# Patient Record
Sex: FEMALE
[De-identification: names, ages, dates, MRNs, and addresses within clinical notes are randomized per-mention and may not be internally consistent; named-entity substitution may affect disease eponyms.]

---

## 2023-07-06 ENCOUNTER — NON-APPOINTMENT (OUTPATIENT)
Age: 38
End: 2023-07-06

## 2023-07-06 PROBLEM — Z00.00 ENCOUNTER FOR PREVENTIVE HEALTH EXAMINATION: Status: ACTIVE | Noted: 2023-07-06

## 2023-07-11 ENCOUNTER — APPOINTMENT (OUTPATIENT)
Dept: BREAST CENTER | Facility: CLINIC | Age: 38
End: 2023-07-11
Payer: COMMERCIAL

## 2023-07-11 ENCOUNTER — NON-APPOINTMENT (OUTPATIENT)
Age: 38
End: 2023-07-11

## 2023-07-11 VITALS
HEIGHT: 70 IN | WEIGHT: 145 LBS | OXYGEN SATURATION: 97 % | BODY MASS INDEX: 20.76 KG/M2 | HEART RATE: 75 BPM | SYSTOLIC BLOOD PRESSURE: 102 MMHG | DIASTOLIC BLOOD PRESSURE: 70 MMHG

## 2023-07-11 DIAGNOSIS — N61.0 MASTITIS WITHOUT ABSCESS: ICD-10-CM

## 2023-07-11 PROCEDURE — 99204 OFFICE O/P NEW MOD 45 MIN: CPT

## 2023-07-11 NOTE — PHYSICAL EXAM
[Normocephalic] : normocephalic [EOMI] : extra ocular movement intact [Examined in the supine and seated position] : examined in the supine and seated position [No dominant masses] : no dominant masses in right breast  [No dominant masses] : no dominant masses left breast [No Nipple Retraction] : no left nipple retraction [No Nipple Discharge] : no left nipple discharge [No Axillary Lymphadenopathy] : no left axillary lymphadenopathy [No Rashes] : no rashes [de-identified] : No erythema, no signs of active infection

## 2023-07-11 NOTE — PAST MEDICAL HISTORY
[Menarche Age ____] : age at menarche was [unfilled] [Total Preg ___] : G[unfilled] [Live Births ___] : P[unfilled]  [Age At Live Birth ___] : Age at live birth: [unfilled] [Definite ___ (Date)] : the last menstrual period was [unfilled] [AB Spont ___] : miscarriages: [unfilled]  [History of Hormone Replacement Treatment] : has no history of hormone replacement treatment [FreeTextEntry7] : No [FreeTextEntry8] : Yes

## 2023-07-11 NOTE — HISTORY OF PRESENT ILLNESS
[FreeTextEntry1] : AMBAR is a 37 year old female, referred by Dr. Silke Liu, who presents for initial evaluation regarding right/left mastitis.  The patient initially noted some breast discomfort in her left breast on June 5.  She was away at a wedding and had not been consistent with her breast-feeding.  She had reached out to her GYN who started her on dicloxacillin for 10 days.  Once she got home she was more consistent with her breast-feeding but still felt some discomfort and was switched to Bactrim.  She has since completed both courses of antibiotics.  She never noticed any fevers at the time.  She did feel some firmness and potentially a clogged area of ducts at the inferior aspect of her breast.  That has also since resolved.  This is her third baby that she has breast-fed.  Her other 2 children she breast-fed for about 13 to 14 months.  Her current baby is 8 months old.  She has never had mastitis previously but states that she has been very consistent with breast-feeding in the past.  She does have a family history of breast cancer on the maternal and paternal side.  She has a maternal aunt who also was diagnosed with breast cancer.  All family members were diagnosed in the postmenopausal stage around the age of 60.  I did recommend genetic counseling for possible genetic testing.  The patient is amenable and agrees.  There were no suspicious findings on physical exam.  The patient did mention some nipple changes that appeared to be not concerning on physical exam.\par \par MINI lifetime risk 31.9.\par \par Family Hx: paternal grandmother with breast cancer at the age of 60, maternal grandmother with breast cancer at the age of 60, maternal aunt with breast cancer at the age of 60\par \par \par

## 2025-01-05 ENCOUNTER — NON-APPOINTMENT (OUTPATIENT)
Age: 40
End: 2025-01-05

## 2025-01-08 ENCOUNTER — NON-APPOINTMENT (OUTPATIENT)
Age: 40
End: 2025-01-08